# Patient Record
Sex: FEMALE | Race: WHITE
[De-identification: names, ages, dates, MRNs, and addresses within clinical notes are randomized per-mention and may not be internally consistent; named-entity substitution may affect disease eponyms.]

---

## 2019-08-23 ENCOUNTER — HOSPITAL ENCOUNTER (OUTPATIENT)
Dept: HOSPITAL 62 - RAD | Age: 79
End: 2019-08-23
Attending: ORTHOPAEDIC SURGERY
Payer: MEDICARE

## 2019-08-23 DIAGNOSIS — M25.552: ICD-10-CM

## 2019-08-23 DIAGNOSIS — M25.452: ICD-10-CM

## 2019-08-23 DIAGNOSIS — M51.36: Primary | ICD-10-CM

## 2019-08-23 PROCEDURE — 72148 MRI LUMBAR SPINE W/O DYE: CPT

## 2019-08-23 NOTE — RADIOLOGY REPORT (SQ)
EXAM DESCRIPTION:  MRI LT LOWER JOINT WITHOUT



COMPLETED DATE/TIME:  8/23/2019 4:07 pm



REASON FOR STUDY:  M25.552 PAIN IN LEFT HIP M51.36  OTHER INTERVERTEBRAL DISC DEGENERATION, LUMBAR RE
GION M25.552  PAIN IN LEFT HIP



COMPARISON:  MRI lumbar spine same date



TECHNIQUE:  [employer] images acquired and stored on PACS. Multiplanar images to include fat sensitive s
equences as T1, fluid sensitive sequences as T2/STIR and gradient echo sequences. Large FOV fat and f
luid sensitive sequences include pelvis and opposite hip.



LIMITATIONS:  None.



FINDINGS:  BONE CORTEX AND MARROW:  No generalized marrow replacement. No occult fracture. No worriso
me bone lesions.

LEFT HIP:

FEMORAL HEAD: No occult fracture. No osteophytes or subchondral cysts. Normal sphericity of femoral h
ead/neck junction. No acetabular dysplasia. No evidence femoroacetabular impingement. No significant 
effusion.  Minimal age-appropriate chondromalacia.

ACETABULUM: No acetabular dysplasia.  Minimal age-appropriate chondromalacia.  No subchondral cysts.

LABRUM: Grossly intact.  No paralabral cysts

TROCHANTER: Large trochanteric bursa the effusion, 7 x 4.5 x 1.5 cm in size.  There is minimal edema 
at the distal gluteal tendons attachment to the greater trochanter

RIGHT HIP: Limited evaluation.  No worrisome bone lesions.  No significant effusion.  Trace fluid rig
ht trochanteric bursa

PELVIS, LOWER LUMBAR SPINE, SACROILIAC JOINTS:

PELVIS : No insufficiency/stress fractures.  No significant degenerative changes.  Sacroiliac joints 
normal.

L SPINE: Lumbar facet arthropathy.  Please see MRI same date

MUSCLES AND SOFT TISSUES: Adductors and piriformis normal. Abductors and greater trochanteric bursa n
ormal without edema or fluid. Iliopsoas bursa without fluid. Hamstring attachments without edema or t
ear.

PELVIC SOFT TISSUES: No masses or adenopathy.  Post hysterectomy

SCIATIC NERVE: Identified, without masses or abnormal signal.

OTHER: No other significant finding.



IMPRESSION:  Large left hip trochanteric bursal effusion



TECHNICAL DOCUMENTATION:  JOB ID:  [id number]

 2011 [employer]- All Rights Reserved



Reading location - IP/workstation name: [id number]

## 2019-08-23 NOTE — RADIOLOGY REPORT (SQ)
EXAM DESCRIPTION:  MRI LUMBAR SPINE WITHOUT



COMPLETED DATE/TIME:  8/23/2019 4:07 pm



REASON FOR STUDY:  M51.36 OTHER INTERVERTEBRAL DISC DEGENERATION, LUMBAR REGION M51.36  OTHER INTERVE
RTEBRAL DISC DEGENERATION, LUMBAR REGION M25.552  PAIN IN LEFT HIP



COMPARISON:  MRI left hip same date



TECHNIQUE:  Sagittal and Axial imaging includes T1, T2, STIR and gradient echo sequences. Coronal T2/
HASTE imaging.



LIMITATIONS:  None.



FINDINGS:  VISUALIZED UPPER ABDOMEN:  Limited evaluation. No acute or suspicious findings suggested.

SEGMENTATION: No transitional anatomy. The lowest well-developed disc space is labeled L5-S1.

ALIGNMENT: Convex rightward lumbar curvature

VERTEBRAE: Intact.

BONE MARROW: Normal. No marrow replacement or reactive changes.

DISC SIGNAL: Multilevel disc space loss of height throughout the lumbar spine, diffuse decreased T2 w
eighted intervertebral disc signal throughout the lumbar spine.

POSTERIOR ELEMENTS:  Generally intact.  No pars defect evident.

HARDWARE: None in the spine.

CORD AND CONUS: Normal in size and signal intensity. Conus at the T12-L1 level.

SOFT TISSUES: No aortic aneurysm seen. No bulky retroperitoneal adenopathy or mass. No paraspinal mas
s or fluid.

T11-12:  At the upper edge of the field of view.  No central or foraminal stenosis.  Mild bilateral f
acet hypertrophy

T12-L1:  No central or foraminal stenosis.  Mild bilateral facet hypertrophy

L1-L2: Minimal posterior disc bulging, mild bilateral facet and ligament hypertrophy.  No central can
al stenosis.  No significant foraminal narrowing.

L2-L3: Mild posterior disc bulging, mild bilateral facet and ligament hypertrophy.  No central stenos
is.  Mild bilateral inferior foraminal narrowing without exiting L2 nerve root impingement.

L3-L4: Mild diffuse disc bulge, moderate bilateral facet and ligament hypertrophy.  Borderline centra
l canal narrowing with flattening of the thecal sac into a triangular shape, best shown on axial T2 i
mage 15.  There is mild right and moderate left foraminal narrowing without exiting L3 nerve root imp
ingement.

L4-L5: Broad diffuse posterior disc bulging, moderate bilateral facet and ligament hypertrophy.  Bord
domonique central canal narrowing best shown on axial T2 image 21.  Mild bilateral inferior foraminal na
rrowing without exiting L3 nerve root impingement.

L5-S1: Mild bilateral facet hypertrophy.  No central or foraminal stenosis.

SACRUM: Visualized upper sacrum intact.

OTHER: No other significant findings.



IMPRESSION:  Multilevel mild degenerative disc changes without high-grade central or foraminal encroa
chment.



TECHNICAL DOCUMENTATION:  JOB ID:  [id number]

 2011 [employer]- All Rights Reserved



Reading location - IP/workstation name: [id number]